# Patient Record
Sex: MALE | Race: WHITE | Employment: FULL TIME | URBAN - METROPOLITAN AREA
[De-identification: names, ages, dates, MRNs, and addresses within clinical notes are randomized per-mention and may not be internally consistent; named-entity substitution may affect disease eponyms.]

---

## 2021-12-07 ENCOUNTER — OFFICE VISIT (OUTPATIENT)
Dept: FAMILY MEDICINE CLINIC | Facility: CLINIC | Age: 36
End: 2021-12-07
Payer: COMMERCIAL

## 2021-12-07 VITALS
TEMPERATURE: 98 F | RESPIRATION RATE: 20 BRPM | BODY MASS INDEX: 20.19 KG/M2 | OXYGEN SATURATION: 97 % | HEIGHT: 70 IN | HEART RATE: 91 BPM | SYSTOLIC BLOOD PRESSURE: 116 MMHG | WEIGHT: 141 LBS | DIASTOLIC BLOOD PRESSURE: 78 MMHG

## 2021-12-07 DIAGNOSIS — Z31.69 INFERTILITY COUNSELING: ICD-10-CM

## 2021-12-07 DIAGNOSIS — Z00.00 ANNUAL PHYSICAL EXAM: Primary | ICD-10-CM

## 2021-12-07 DIAGNOSIS — Z80.0 FAMILY HISTORY OF COLON CANCER: ICD-10-CM

## 2021-12-07 DIAGNOSIS — N50.89 MASS OF LEFT TESTICLE: ICD-10-CM

## 2021-12-07 DIAGNOSIS — K59.00 CONSTIPATION, UNSPECIFIED CONSTIPATION TYPE: ICD-10-CM

## 2021-12-07 PROCEDURE — 3008F BODY MASS INDEX DOCD: CPT | Performed by: FAMILY MEDICINE

## 2021-12-07 PROCEDURE — 99385 PREV VISIT NEW AGE 18-39: CPT | Performed by: FAMILY MEDICINE

## 2021-12-07 PROCEDURE — 3078F DIAST BP <80 MM HG: CPT | Performed by: FAMILY MEDICINE

## 2021-12-07 PROCEDURE — 3074F SYST BP LT 130 MM HG: CPT | Performed by: FAMILY MEDICINE

## 2021-12-07 NOTE — H&P
Chacorta Faust is a 31year old male who presents for a complete physical exam.   HPI:   Pt complains of difficulty getting pregnant. Have been trying x 6 months.      + maternal grandfather and uncle both with colon cancer before 48.   Mother with polyps ever breathing  CV: S1S2, RRR without murmur  GI: good BS's;no masses, HSM or tenderness  : two descended testes,no scrotal tenderness or mass, normal penis no lesions, no hernia  MS: Frank, no bony deformities, gait normal  EXT: no cyanosis, clubbing or edema Fertility Semen Analysis [E]          Standing Status: Future          Standing Expiration Date: 12/7/2022          Order Specific Question: Release to patient          Answer: Immediate

## 2021-12-15 ENCOUNTER — LAB ENCOUNTER (OUTPATIENT)
Dept: LAB | Age: 36
End: 2021-12-15
Attending: FAMILY MEDICINE
Payer: COMMERCIAL

## 2021-12-15 DIAGNOSIS — Z00.00 ANNUAL PHYSICAL EXAM: ICD-10-CM

## 2021-12-15 PROCEDURE — 80061 LIPID PANEL: CPT | Performed by: FAMILY MEDICINE

## 2021-12-15 PROCEDURE — 82306 VITAMIN D 25 HYDROXY: CPT | Performed by: FAMILY MEDICINE

## 2021-12-15 PROCEDURE — 80050 GENERAL HEALTH PANEL: CPT | Performed by: FAMILY MEDICINE

## 2022-01-06 ENCOUNTER — HOSPITAL ENCOUNTER (OUTPATIENT)
Dept: ULTRASOUND IMAGING | Age: 37
Discharge: HOME | End: 2022-01-06
Attending: FAMILY MEDICINE
Payer: COMMERCIAL

## 2022-01-06 DIAGNOSIS — N50.89 MASS OF LEFT TESTICLE: ICD-10-CM

## 2022-01-06 PROCEDURE — 76870 US EXAM SCROTUM: CPT | Performed by: FAMILY MEDICINE

## 2022-01-06 PROCEDURE — 93975 VASCULAR STUDY: CPT | Performed by: FAMILY MEDICINE

## 2022-03-10 ENCOUNTER — TELEPHONE (OUTPATIENT)
Dept: FAMILY MEDICINE CLINIC | Facility: CLINIC | Age: 37
End: 2022-03-10

## 2022-03-10 NOTE — TELEPHONE ENCOUNTER
Spoke with patient, he confirmed this is Dr. Festus Rincon office, order for semen analysis faxed to Dr. Poonam Harden office for patient complete test.

## 2022-03-10 NOTE — TELEPHONE ENCOUNTER
Pt was given an order in December 2021  From Dr. Monique Chang for a semen analysis   Pt tried to bring the order to a fertility specialist on [address] in [place] through [facility].  He was told he needs a referral     Please advise

## 2022-03-16 ENCOUNTER — PATIENT MESSAGE (OUTPATIENT)
Dept: FAMILY MEDICINE CLINIC | Facility: CLINIC | Age: 37
End: 2022-03-16

## 2022-03-16 NOTE — TELEPHONE ENCOUNTER
From: Chiquita Mortimer  To: Jake Horne DO  Sent: 3/16/2022 4:18 AM CDT  Subject: Plantars wart    I forgot to mention this during my physical. But I have 2 plantars wart on my feet that I need removed they are now becoming painful.  Should I see a podiatrist or can this be done in the office

## 2022-03-25 PROBLEM — E53.8 VITAMIN B12 DEFICIENCY (NON ANEMIC): Status: ACTIVE | Noted: 2021-11-20

## 2022-03-25 PROBLEM — K21.9 GERD (GASTROESOPHAGEAL REFLUX DISEASE): Status: ACTIVE | Noted: 2022-03-25

## 2022-04-01 ENCOUNTER — OFFICE VISIT (OUTPATIENT)
Dept: FAMILY MEDICINE CLINIC | Facility: CLINIC | Age: 37
End: 2022-04-01
Payer: COMMERCIAL

## 2022-04-01 VITALS
OXYGEN SATURATION: 98 % | HEIGHT: 70 IN | HEART RATE: 78 BPM | TEMPERATURE: 99 F | WEIGHT: 147 LBS | RESPIRATION RATE: 18 BRPM | SYSTOLIC BLOOD PRESSURE: 130 MMHG | BODY MASS INDEX: 21.05 KG/M2 | DIASTOLIC BLOOD PRESSURE: 78 MMHG

## 2022-04-01 DIAGNOSIS — B07.0 PLANTAR WART: Primary | ICD-10-CM

## 2022-04-01 PROCEDURE — 3078F DIAST BP <80 MM HG: CPT | Performed by: FAMILY MEDICINE

## 2022-04-01 PROCEDURE — 99213 OFFICE O/P EST LOW 20 MIN: CPT | Performed by: FAMILY MEDICINE

## 2022-04-01 PROCEDURE — 3075F SYST BP GE 130 - 139MM HG: CPT | Performed by: FAMILY MEDICINE

## 2022-04-01 PROCEDURE — 3008F BODY MASS INDEX DOCD: CPT | Performed by: FAMILY MEDICINE

## 2022-04-01 NOTE — PROGRESS NOTES
Subjective:   Patient ID: Patsy Lux is a 30year old male. Right plantar wart x 1 year. Tried OTC treatment shrinks but then it comes back. Hurts to walk on it. History/Other:   Review of Systems  No current outpatient medications on file. Allergies:  Dairy Digestive         OTHER (SEE COMMENTS)    Comment:Gas bloating    Objective:   Physical Exam  Skin:     Comments: Right foot plantar wart x 2         Assessment & Plan:   Plantar wart  (primary encounter diagnosis)  Wart freezed 3 times each  Follow up in 10 days. No orders of the defined types were placed in this encounter.       Meds This Visit:  Requested Prescriptions      No prescriptions requested or ordered in this encounter       Imaging & Referrals:  None

## 2022-04-14 ENCOUNTER — OFFICE VISIT (OUTPATIENT)
Dept: FAMILY MEDICINE CLINIC | Facility: CLINIC | Age: 37
End: 2022-04-14
Payer: COMMERCIAL

## 2022-04-14 VITALS
DIASTOLIC BLOOD PRESSURE: 78 MMHG | OXYGEN SATURATION: 99 % | SYSTOLIC BLOOD PRESSURE: 122 MMHG | WEIGHT: 147 LBS | BODY MASS INDEX: 21.05 KG/M2 | HEIGHT: 70 IN | TEMPERATURE: 97 F | RESPIRATION RATE: 20 BRPM | HEART RATE: 89 BPM

## 2022-04-14 DIAGNOSIS — B07.0 PLANTAR WART: Primary | ICD-10-CM

## 2022-04-29 ENCOUNTER — OFFICE VISIT (OUTPATIENT)
Dept: FAMILY MEDICINE CLINIC | Facility: CLINIC | Age: 37
End: 2022-04-29
Payer: COMMERCIAL

## 2022-04-29 VITALS
SYSTOLIC BLOOD PRESSURE: 122 MMHG | WEIGHT: 147 LBS | HEART RATE: 90 BPM | DIASTOLIC BLOOD PRESSURE: 62 MMHG | TEMPERATURE: 98 F | OXYGEN SATURATION: 99 % | RESPIRATION RATE: 18 BRPM | BODY MASS INDEX: 21.05 KG/M2 | HEIGHT: 70 IN

## 2022-04-29 DIAGNOSIS — B07.0 PLANTAR WART: Primary | ICD-10-CM

## 2022-04-29 PROCEDURE — 3074F SYST BP LT 130 MM HG: CPT | Performed by: FAMILY MEDICINE

## 2022-04-29 PROCEDURE — 3078F DIAST BP <80 MM HG: CPT | Performed by: FAMILY MEDICINE

## 2022-04-29 PROCEDURE — 3008F BODY MASS INDEX DOCD: CPT | Performed by: FAMILY MEDICINE

## 2022-04-29 PROCEDURE — 17110 DESTRUCTION B9 LES UP TO 14: CPT | Performed by: FAMILY MEDICINE

## 2022-04-30 NOTE — PROGRESS NOTES
Subjective:   Patient ID: Geoffrey Plata is a 30year old male. Right plantar wart x 1 year. Tried OTC treatment shrinks but then it comes back. Hurts to walk on it.  trested it 4/1 and 4/14. Improving. History/Other:   Review of Systems  No current outpatient medications on file. Allergies:  Dairy Digestive         OTHER (SEE COMMENTS)    Comment:Gas bloating    Objective:   Physical Exam  Skin:     Comments: Right foot plantar wart x 2         Assessment & Plan:   Plantar wart  (primary encounter diagnosis)  Wart freezed 3 times each  Follow up in 10-14 days.     Meds This Visit:  Requested Prescriptions      No prescriptions requested or ordered in this encounter       Imaging & Referrals:  None

## 2022-05-07 ENCOUNTER — LAB ENCOUNTER (OUTPATIENT)
Dept: LAB | Age: 37
End: 2022-05-07
Attending: STUDENT IN AN ORGANIZED HEALTH CARE EDUCATION/TRAINING PROGRAM
Payer: COMMERCIAL

## 2022-05-07 DIAGNOSIS — Z01.818 PRE-OP TESTING: ICD-10-CM

## 2022-05-07 DIAGNOSIS — R19.5 LOOSE STOOLS: ICD-10-CM

## 2022-05-07 LAB — IGA SERPL-MCNC: 81.2 MG/DL (ref 70–312)

## 2022-05-07 PROCEDURE — 86364 TISS TRNSGLTMNASE EA IG CLAS: CPT

## 2022-05-07 PROCEDURE — 82784 ASSAY IGA/IGD/IGG/IGM EACH: CPT

## 2022-05-07 PROCEDURE — 86256 FLUORESCENT ANTIBODY TITER: CPT

## 2022-05-07 PROCEDURE — 36415 COLL VENOUS BLD VENIPUNCTURE: CPT

## 2022-05-08 LAB — SARS-COV-2 RNA RESP QL NAA+PROBE: NOT DETECTED

## 2022-05-09 ENCOUNTER — LAB ENCOUNTER (OUTPATIENT)
Dept: LAB | Age: 37
End: 2022-05-09
Attending: STUDENT IN AN ORGANIZED HEALTH CARE EDUCATION/TRAINING PROGRAM
Payer: COMMERCIAL

## 2022-05-09 DIAGNOSIS — R19.5 LOOSE STOOLS: ICD-10-CM

## 2022-05-09 PROCEDURE — 82656 EL-1 FECAL QUAL/SEMIQ: CPT

## 2022-05-10 PROBLEM — Z80.0 FAMILY HISTORY OF COLON CANCER: Status: ACTIVE | Noted: 2022-05-10

## 2022-05-10 PROBLEM — R19.4 CHANGE IN BOWEL HABITS: Status: ACTIVE | Noted: 2022-05-10

## 2022-05-10 PROBLEM — Z12.11 SPECIAL SCREENING FOR MALIGNANT NEOPLASM OF COLON: Status: ACTIVE | Noted: 2022-05-10

## 2022-05-10 PROBLEM — K21.00 GERD WITH ESOPHAGITIS: Status: ACTIVE | Noted: 2022-05-10

## 2022-05-10 LAB — TTG IGA SER-ACNC: 0.3 U/ML (ref ?–7)

## 2022-05-10 NOTE — PROGRESS NOTES
Celiac markers are negative. Await pathology from the gastric biopsies (eval for H pylori) and colon biopsies (eval for microscopic colitis). If EGD biopsies are negative, suspect intermittent vomiting/regurgitation is from reflux, given that mild acid reflux changes noted during the EGD. Start pantoprazole 40 mg tablet daily, 30 min prior to breakfast.    The colonoscopy did not reveal Crohn's disease, ulcerative colitis, or any colon polyps or growths. In absence of these findings, and with the mild improvement in your bowel regularity with fiber and probiotics, you could have some element of IBS. If biopsies from colitis return negative (as expected), then trial 14 day course of Xifaxin (an antibiotic used for mild IBS symptoms). Follow-up in a couple months to see how the changes are helping.     PM

## 2022-05-13 LAB — PANCREATIC ELASTASE , FECAL: 400 UG/G

## 2022-05-16 ENCOUNTER — OFFICE VISIT (OUTPATIENT)
Dept: FAMILY MEDICINE CLINIC | Facility: CLINIC | Age: 37
End: 2022-05-16
Payer: COMMERCIAL

## 2022-05-16 VITALS
TEMPERATURE: 98 F | RESPIRATION RATE: 20 BRPM | DIASTOLIC BLOOD PRESSURE: 66 MMHG | WEIGHT: 145.63 LBS | OXYGEN SATURATION: 98 % | HEART RATE: 68 BPM | HEIGHT: 70 IN | SYSTOLIC BLOOD PRESSURE: 114 MMHG | BODY MASS INDEX: 20.85 KG/M2

## 2022-05-16 DIAGNOSIS — B07.0 PLANTAR WART: Primary | ICD-10-CM

## 2022-06-03 ENCOUNTER — OFFICE VISIT (OUTPATIENT)
Dept: FAMILY MEDICINE CLINIC | Facility: CLINIC | Age: 37
End: 2022-06-03
Payer: COMMERCIAL

## 2022-06-03 VITALS
WEIGHT: 144.63 LBS | SYSTOLIC BLOOD PRESSURE: 118 MMHG | OXYGEN SATURATION: 96 % | HEIGHT: 70 IN | BODY MASS INDEX: 20.7 KG/M2 | TEMPERATURE: 98 F | DIASTOLIC BLOOD PRESSURE: 68 MMHG | HEART RATE: 90 BPM | RESPIRATION RATE: 18 BRPM

## 2022-06-03 DIAGNOSIS — B07.0 PLANTAR WART OF RIGHT FOOT: Primary | ICD-10-CM

## 2022-06-17 ENCOUNTER — OFFICE VISIT (OUTPATIENT)
Dept: FAMILY MEDICINE CLINIC | Facility: CLINIC | Age: 37
End: 2022-06-17
Payer: COMMERCIAL

## 2022-06-17 VITALS
DIASTOLIC BLOOD PRESSURE: 62 MMHG | BODY MASS INDEX: 20.76 KG/M2 | OXYGEN SATURATION: 98 % | SYSTOLIC BLOOD PRESSURE: 106 MMHG | TEMPERATURE: 98 F | HEIGHT: 70 IN | HEART RATE: 66 BPM | RESPIRATION RATE: 16 BRPM | WEIGHT: 145 LBS

## 2022-06-17 DIAGNOSIS — B07.0 PLANTAR WART: Primary | ICD-10-CM

## 2022-06-17 PROCEDURE — 3074F SYST BP LT 130 MM HG: CPT | Performed by: FAMILY MEDICINE

## 2022-06-17 PROCEDURE — 17110 DESTRUCTION B9 LES UP TO 14: CPT | Performed by: FAMILY MEDICINE

## 2022-06-17 PROCEDURE — 3078F DIAST BP <80 MM HG: CPT | Performed by: FAMILY MEDICINE

## 2022-06-17 PROCEDURE — 3008F BODY MASS INDEX DOCD: CPT | Performed by: FAMILY MEDICINE

## 2022-06-22 ENCOUNTER — OFFICE VISIT (OUTPATIENT)
Dept: FAMILY MEDICINE CLINIC | Facility: CLINIC | Age: 37
End: 2022-06-22
Payer: COMMERCIAL

## 2022-06-22 VITALS
RESPIRATION RATE: 20 BRPM | SYSTOLIC BLOOD PRESSURE: 130 MMHG | HEART RATE: 73 BPM | TEMPERATURE: 99 F | BODY MASS INDEX: 20.76 KG/M2 | HEIGHT: 70 IN | OXYGEN SATURATION: 98 % | DIASTOLIC BLOOD PRESSURE: 72 MMHG | WEIGHT: 145 LBS

## 2022-06-22 DIAGNOSIS — R05.9 COUGH: Primary | ICD-10-CM

## 2022-06-22 DIAGNOSIS — Z20.822 SUSPECTED 2019 NOVEL CORONAVIRUS INFECTION: ICD-10-CM

## 2022-06-22 DIAGNOSIS — J02.9 SORE THROAT: ICD-10-CM

## 2022-06-22 PROCEDURE — 3075F SYST BP GE 130 - 139MM HG: CPT | Performed by: NURSE PRACTITIONER

## 2022-06-22 PROCEDURE — 3008F BODY MASS INDEX DOCD: CPT | Performed by: NURSE PRACTITIONER

## 2022-06-22 PROCEDURE — 3078F DIAST BP <80 MM HG: CPT | Performed by: NURSE PRACTITIONER

## 2022-06-22 PROCEDURE — 99213 OFFICE O/P EST LOW 20 MIN: CPT | Performed by: NURSE PRACTITIONER

## 2022-06-22 RX ORDER — BENZONATATE 200 MG/1
200 CAPSULE ORAL 3 TIMES DAILY PRN
Qty: 20 CAPSULE | Refills: 0 | Status: SHIPPED | OUTPATIENT
Start: 2022-06-22 | End: 2022-06-29

## 2022-06-23 LAB — SARS-COV-2 RNA RESP QL NAA+PROBE: NOT DETECTED

## 2022-07-08 ENCOUNTER — OFFICE VISIT (OUTPATIENT)
Dept: FAMILY MEDICINE CLINIC | Facility: CLINIC | Age: 37
End: 2022-07-08
Payer: COMMERCIAL

## 2022-07-08 VITALS
WEIGHT: 147 LBS | SYSTOLIC BLOOD PRESSURE: 122 MMHG | RESPIRATION RATE: 16 BRPM | DIASTOLIC BLOOD PRESSURE: 64 MMHG | HEIGHT: 70 IN | HEART RATE: 68 BPM | BODY MASS INDEX: 21.05 KG/M2 | OXYGEN SATURATION: 98 %

## 2022-07-08 DIAGNOSIS — B07.0 PLANTAR WART: Primary | ICD-10-CM

## 2022-07-08 PROCEDURE — 3008F BODY MASS INDEX DOCD: CPT | Performed by: FAMILY MEDICINE

## 2022-07-08 PROCEDURE — 3078F DIAST BP <80 MM HG: CPT | Performed by: FAMILY MEDICINE

## 2022-07-08 PROCEDURE — 3074F SYST BP LT 130 MM HG: CPT | Performed by: FAMILY MEDICINE

## 2022-07-08 PROCEDURE — 17110 DESTRUCTION B9 LES UP TO 14: CPT | Performed by: FAMILY MEDICINE

## 2022-12-08 ENCOUNTER — OFFICE VISIT (OUTPATIENT)
Dept: FAMILY MEDICINE CLINIC | Facility: CLINIC | Age: 37
End: 2022-12-08
Payer: COMMERCIAL

## 2022-12-08 VITALS
OXYGEN SATURATION: 98 % | BODY MASS INDEX: 21.16 KG/M2 | SYSTOLIC BLOOD PRESSURE: 116 MMHG | TEMPERATURE: 97 F | HEIGHT: 70 IN | DIASTOLIC BLOOD PRESSURE: 78 MMHG | HEART RATE: 80 BPM | RESPIRATION RATE: 18 BRPM | WEIGHT: 147.81 LBS

## 2022-12-08 DIAGNOSIS — B02.9 HERPES ZOSTER WITHOUT COMPLICATION: Primary | ICD-10-CM

## 2022-12-08 PROCEDURE — 3008F BODY MASS INDEX DOCD: CPT | Performed by: NURSE PRACTITIONER

## 2022-12-08 PROCEDURE — 3078F DIAST BP <80 MM HG: CPT | Performed by: NURSE PRACTITIONER

## 2022-12-08 PROCEDURE — 3074F SYST BP LT 130 MM HG: CPT | Performed by: NURSE PRACTITIONER

## 2022-12-08 PROCEDURE — 99213 OFFICE O/P EST LOW 20 MIN: CPT | Performed by: NURSE PRACTITIONER

## 2022-12-08 RX ORDER — VALACYCLOVIR HYDROCHLORIDE 1 G/1
1000 TABLET, FILM COATED ORAL
Qty: 21 TABLET | Refills: 0 | Status: SHIPPED | OUTPATIENT
Start: 2022-12-08 | End: 2022-12-15

## 2022-12-08 RX ORDER — GARLIC EXTRACT 500 MG
1 CAPSULE ORAL DAILY
COMMUNITY

## 2023-02-16 ENCOUNTER — LAB ENCOUNTER (OUTPATIENT)
Dept: LAB | Age: 38
End: 2023-02-16
Attending: FAMILY MEDICINE
Payer: COMMERCIAL

## 2023-02-16 ENCOUNTER — EKG ENCOUNTER (OUTPATIENT)
Dept: LAB | Age: 38
End: 2023-02-16
Attending: FAMILY MEDICINE
Payer: COMMERCIAL

## 2023-02-16 ENCOUNTER — TELEPHONE (OUTPATIENT)
Dept: FAMILY MEDICINE CLINIC | Facility: CLINIC | Age: 38
End: 2023-02-16

## 2023-02-16 ENCOUNTER — HOSPITAL ENCOUNTER (OUTPATIENT)
Dept: GENERAL RADIOLOGY | Age: 38
Discharge: HOME | End: 2023-02-16
Attending: FAMILY MEDICINE
Payer: COMMERCIAL

## 2023-02-16 ENCOUNTER — OFFICE VISIT (OUTPATIENT)
Dept: FAMILY MEDICINE CLINIC | Facility: CLINIC | Age: 38
End: 2023-02-16
Payer: COMMERCIAL

## 2023-02-16 VITALS
SYSTOLIC BLOOD PRESSURE: 100 MMHG | HEIGHT: 70 IN | WEIGHT: 147 LBS | BODY MASS INDEX: 21.05 KG/M2 | RESPIRATION RATE: 18 BRPM | HEART RATE: 94 BPM | OXYGEN SATURATION: 99 % | DIASTOLIC BLOOD PRESSURE: 60 MMHG

## 2023-02-16 DIAGNOSIS — K21.00 GASTROESOPHAGEAL REFLUX DISEASE WITH ESOPHAGITIS WITHOUT HEMORRHAGE: ICD-10-CM

## 2023-02-16 DIAGNOSIS — Z00.00 LABORATORY TESTS ORDERED AS PART OF A COMPLETE PHYSICAL EXAM (CPE): ICD-10-CM

## 2023-02-16 DIAGNOSIS — R07.89 ATYPICAL CHEST PAIN: ICD-10-CM

## 2023-02-16 DIAGNOSIS — R07.89 OTHER CHEST PAIN: Primary | ICD-10-CM

## 2023-02-16 DIAGNOSIS — R07.89 ATYPICAL CHEST PAIN: Primary | ICD-10-CM

## 2023-02-16 DIAGNOSIS — R51.9 ACUTE NONINTRACTABLE HEADACHE, UNSPECIFIED HEADACHE TYPE: ICD-10-CM

## 2023-02-16 LAB
ALBUMIN SERPL-MCNC: 4.7 G/DL (ref 3.4–5)
ALBUMIN/GLOB SERPL: 1.5 {RATIO} (ref 1–2)
ALP LIVER SERPL-CCNC: 66 U/L
ALT SERPL-CCNC: 27 U/L
ANION GAP SERPL CALC-SCNC: 0 MMOL/L (ref 0–18)
AST SERPL-CCNC: 17 U/L (ref 15–37)
BASOPHILS # BLD AUTO: 0.04 X10(3) UL (ref 0–0.2)
BASOPHILS NFR BLD AUTO: 0.6 %
BILIRUB SERPL-MCNC: 0.6 MG/DL (ref 0.1–2)
BUN BLD-MCNC: 9 MG/DL (ref 7–18)
CALCIUM BLD-MCNC: 9.6 MG/DL (ref 8.5–10.1)
CHLORIDE SERPL-SCNC: 105 MMOL/L (ref 98–112)
CHOLEST SERPL-MCNC: 226 MG/DL (ref ?–200)
CO2 SERPL-SCNC: 34 MMOL/L (ref 21–32)
CREAT BLD-MCNC: 0.92 MG/DL
EOSINOPHIL # BLD AUTO: 0.08 X10(3) UL (ref 0–0.7)
EOSINOPHIL NFR BLD AUTO: 1.2 %
ERYTHROCYTE [DISTWIDTH] IN BLOOD BY AUTOMATED COUNT: 11.9 %
FASTING PATIENT LIPID ANSWER: YES
FASTING STATUS PATIENT QL REPORTED: YES
GFR SERPLBLD BASED ON 1.73 SQ M-ARVRAT: 111 ML/MIN/1.73M2 (ref 60–?)
GLOBULIN PLAS-MCNC: 3.2 G/DL (ref 2.8–4.4)
GLUCOSE BLD-MCNC: 81 MG/DL (ref 70–99)
HCT VFR BLD AUTO: 47.8 %
HDLC SERPL-MCNC: 52 MG/DL (ref 40–59)
HGB BLD-MCNC: 15.8 G/DL
IMM GRANULOCYTES # BLD AUTO: 0.01 X10(3) UL (ref 0–1)
IMM GRANULOCYTES NFR BLD: 0.2 %
LDLC SERPL CALC-MCNC: 150 MG/DL (ref ?–100)
LYMPHOCYTES # BLD AUTO: 1.96 X10(3) UL (ref 1–4)
LYMPHOCYTES NFR BLD AUTO: 30.5 %
MCH RBC QN AUTO: 30.3 PG (ref 26–34)
MCHC RBC AUTO-ENTMCNC: 33.1 G/DL (ref 31–37)
MCV RBC AUTO: 91.6 FL
MONOCYTES # BLD AUTO: 0.49 X10(3) UL (ref 0.1–1)
MONOCYTES NFR BLD AUTO: 7.6 %
NEUTROPHILS # BLD AUTO: 3.85 X10 (3) UL (ref 1.5–7.7)
NEUTROPHILS # BLD AUTO: 3.85 X10(3) UL (ref 1.5–7.7)
NEUTROPHILS NFR BLD AUTO: 59.9 %
NONHDLC SERPL-MCNC: 174 MG/DL (ref ?–130)
OSMOLALITY SERPL CALC.SUM OF ELEC: 286 MOSM/KG (ref 275–295)
PLATELET # BLD AUTO: 189 10(3)UL (ref 150–450)
POTASSIUM SERPL-SCNC: 3.8 MMOL/L (ref 3.5–5.1)
PROT SERPL-MCNC: 7.9 G/DL (ref 6.4–8.2)
RBC # BLD AUTO: 5.22 X10(6)UL
SODIUM SERPL-SCNC: 139 MMOL/L (ref 136–145)
TRIGL SERPL-MCNC: 136 MG/DL (ref 30–149)
TSI SER-ACNC: 2.06 MIU/ML (ref 0.36–3.74)
VLDLC SERPL CALC-MCNC: 26 MG/DL (ref 0–30)
WBC # BLD AUTO: 6.4 X10(3) UL (ref 4–11)

## 2023-02-16 PROCEDURE — 93005 ELECTROCARDIOGRAM TRACING: CPT

## 2023-02-16 PROCEDURE — 93010 ELECTROCARDIOGRAM REPORT: CPT | Performed by: INTERNAL MEDICINE

## 2023-02-16 PROCEDURE — 99214 OFFICE O/P EST MOD 30 MIN: CPT | Performed by: FAMILY MEDICINE

## 2023-02-16 PROCEDURE — 3074F SYST BP LT 130 MM HG: CPT | Performed by: FAMILY MEDICINE

## 2023-02-16 PROCEDURE — 71046 X-RAY EXAM CHEST 2 VIEWS: CPT | Performed by: FAMILY MEDICINE

## 2023-02-16 PROCEDURE — 80061 LIPID PANEL: CPT | Performed by: FAMILY MEDICINE

## 2023-02-16 PROCEDURE — 3008F BODY MASS INDEX DOCD: CPT | Performed by: FAMILY MEDICINE

## 2023-02-16 PROCEDURE — 3078F DIAST BP <80 MM HG: CPT | Performed by: FAMILY MEDICINE

## 2023-02-16 PROCEDURE — 80050 GENERAL HEALTH PANEL: CPT | Performed by: FAMILY MEDICINE

## 2023-02-16 RX ORDER — PANTOPRAZOLE SODIUM 40 MG/1
TABLET, DELAYED RELEASE ORAL
Qty: 30 TABLET | Refills: 0 | Status: SHIPPED | OUTPATIENT
Start: 2023-02-16

## 2023-02-16 RX ORDER — SUMATRIPTAN 50 MG/1
50 TABLET, FILM COATED ORAL EVERY 2 HOUR PRN
Qty: 8 TABLET | Refills: 0 | Status: SHIPPED | OUTPATIENT
Start: 2023-02-16

## 2023-02-16 NOTE — TELEPHONE ENCOUNTER
Spoke to pt, states for 2 weeks been getting chest pain at night. Unsure if it is related to GERD? No sob, no numbness/tingling, + HA on and off (taking tylenol with relief)  Increase hydration    Does not have recent b/p. Advised to go check at pharmacy.    Advised ER visit, especially if symptoms worsen, or elevated b/p  Pt agrees and expressed understanding    appt kept for Tuesday to f/u

## 2023-02-18 LAB
ATRIAL RATE: 68 BPM
P AXIS: 64 DEGREES
P-R INTERVAL: 124 MS
Q-T INTERVAL: 382 MS
QRS DURATION: 88 MS
QTC CALCULATION (BEZET): 406 MS
R AXIS: -61 DEGREES
T AXIS: 63 DEGREES
VENTRICULAR RATE: 68 BPM

## 2024-03-26 ENCOUNTER — TELEPHONE (OUTPATIENT)
Dept: FAMILY MEDICINE CLINIC | Facility: CLINIC | Age: 39
End: 2024-03-26

## 2024-03-26 NOTE — TELEPHONE ENCOUNTER
RECEIVED 3/25/24    SENT TO [facility]  3/26/24    [facility]  [address]  [place] [address]  [phone]      ALL RECORDS

## 2024-11-22 ENCOUNTER — OFFICE VISIT (OUTPATIENT)
Dept: FAMILY MEDICINE CLINIC | Facility: CLINIC | Age: 39
End: 2024-11-22
Payer: COMMERCIAL

## 2024-11-22 VITALS
SYSTOLIC BLOOD PRESSURE: 112 MMHG | HEIGHT: 70 IN | RESPIRATION RATE: 18 BRPM | OXYGEN SATURATION: 98 % | WEIGHT: 152 LBS | BODY MASS INDEX: 21.76 KG/M2 | DIASTOLIC BLOOD PRESSURE: 78 MMHG | HEART RATE: 64 BPM

## 2024-11-22 DIAGNOSIS — E55.9 VITAMIN D DEFICIENCY: ICD-10-CM

## 2024-11-22 DIAGNOSIS — Z00.00 ANNUAL PHYSICAL EXAM: Primary | ICD-10-CM

## 2024-11-22 DIAGNOSIS — J31.0 CHRONIC RHINITIS: ICD-10-CM

## 2024-11-22 DIAGNOSIS — M54.12 CERVICAL RADICULOPATHY: ICD-10-CM

## 2024-11-22 DIAGNOSIS — Z23 NEED FOR TDAP VACCINATION: ICD-10-CM

## 2024-11-22 RX ORDER — MULTIVITAMIN
TABLET ORAL
COMMUNITY

## 2024-11-22 RX ORDER — FLUTICASONE PROPIONATE 50 MCG
2 SPRAY, SUSPENSION (ML) NASAL DAILY
Qty: 1 EACH | Refills: 1 | Status: SHIPPED | OUTPATIENT
Start: 2024-11-22

## 2024-11-22 NOTE — H&P
Miguel Maldonado is a 39 year old male who presents for a complete physical exam.   HPI:   Pt complains of chronic right neck pain.  Pain shoots into right arm and right hand and arm numbness and tingling.  Has been on going for > 10 yrs.  Worsening.  Even if son pushs on throat he feels the cervical pain and radicular symptoms.  May have had some injury while in , not sure which one.     Chronic intermittent rhinitis.  At times epistaxis.  Nose looks deviated but has never seen anyone for it.  No trouble breathing.    Current Outpatient Medications   Medication Sig Dispense Refill    Multiple Vitamin (MULTI-VITAMIN DAILY) Oral Tab Take by mouth.      Probiotic Product (PROBIOTIC-10 OR) Take by mouth.      fluticasone propionate 50 MCG/ACT Nasal Suspension 2 sprays by Each Nare route daily. 1 each 1      Past Medical History:    Abdominal pain    Constipation    Flatulence/gas pain/belching    Food intolerance    Headache disorder    Heartburn    Indigestion    Irregular bowel habits    Sleep disturbance      No past surgical history on file.   Family History   Problem Relation Age of Onset    Diabetes Father         Type 2    Colon Cancer Paternal Grandfather         Mothers side. Had large section of colon removed ar 58    Colon Cancer Maternal Uncle         Stage 3 colon cancer at 49      Social History:  Social History     Socioeconomic History    Marital status:    Tobacco Use    Smoking status: Former     Current packs/day: 0.00     Average packs/day: 1 pack/day for 8.0 years (8.0 ttl pk-yrs)     Types: Cigarettes     Start date: 5/1/2006     Quit date: 5/1/2014     Years since quitting: 10.5    Smokeless tobacco: Never   Substance and Sexual Activity    Alcohol use: Yes     Alcohol/week: 2.0 standard drinks of alcohol     Types: 2 Cans of beer per week    Drug use: Never         REVIEW OF SYSTEMS:   GENERAL: feels well otherwise  SKIN: denies any unusual skin lesions  EYES:denies blurred vision or  double vision  HEENT: denies nasal congestion, sinus pain or ST, denies changes in hearing or vision  LUNGS: denies shortness of breath with exertion or frequent cough  CV: denies chest pain, pressure or palpitations  GI: denies abdominal pain,denies heartburn, denies chronic diarrhea or constipation  : denies nocturia or changes in stream, denies erectile dysfunction  MS: denies back pain, arthralgias or myalgias  NEURO: denies headaches or dizziness  PSYCH: denies depression or anxiety  HEMATOLOGIC: denies hx of anemia, easy bruising or bleeding  ENDOCRINE:denies frequent thirst or urination, denies unintentional weight gain/loss  ALL/ASTHMA: denies hx of allergy or asthma    EXAM:   /78   Pulse 64   Resp 18   Ht 5' 10\" (1.778 m)   Wt 152 lb (68.9 kg)   SpO2 98%   BMI 21.81 kg/m²   Body mass index is 21.81 kg/m².     GENERAL: well developed, well nourished,in no apparent distress  SKIN: no rashes,no suspicious lesions  HEENT: atraumatic, normocephalic, PERRLA, conjunctiva clear, TMs clear, posterior pharynx clear, no congestion  NECK: supple,no adenopathy,no thyromegaly  LUNGS: clear to auscultation, easy breathing  CV: S1S2, RRR without murmur  GI: good BS's;no masses, HSM or tenderness  : two descended testes,no scrotal tenderness or mass, normal penis no lesions, no hernia  MS: Frank, no bony deformities, gait normal  EXT: no cyanosis, clubbing or edema  NEURO: Oriented times three, strength 5/5 x 4 ext, LE DTRs 2+  PSYCH: mood and affect appropriate    ASSESSMENT AND PLAN:   Miguel Maldonado is a 39 year old male who presents for a complete physical exam. Recommended heart healthy diet, routine exercise, and annual flu vaccines.  Routine testicular exams reinforced. The patient indicates understanding of these issues and agrees to the plan.  Follow up in 1 year.    1. Annual physical exam    2. Cervical radiculopathy  - MRI SPINE CERVICAL (CPT=72141); Future  - OP REFERRAL TO [facility]    3. Need for Tdap vaccination  - TETANUS, DIPHTHERIA TOXOIDS AND ACELLULAR PERTUSIS VACCINE (TDAP), >7 YEARS, IM USE    4. Chronic rhinitis  flonase  - ENT Referral - In Network    Orders Placed This Encounter   Procedures    Lipid Panel     Standing Status:   Future     Standing Expiration Date:   11/22/2025    CBC With Differential With Platelet     Standing Status:   Future     Standing Expiration Date:   11/22/2025    Comp Metabolic Panel (14)     Standing Status:   Future     Standing Expiration Date:   11/22/2025    TSH W Reflex To Free T4     Standing Status:   Future     Standing Expiration Date:   11/22/2025    Vitamin D     Standing Status:   Future     Standing Expiration Date:   11/22/2025     Order Specific Question:   Please pick the scenario that best fits the purpose for ordering this test     Answer:   General Screening/Vit D deficiency (25-Hydroxy)     Order Specific Question:   Release to patient     Answer:   Immediate    TETANUS, DIPHTHERIA TOXOIDS AND ACELLULAR PERTUSIS VACCINE (TDAP), >7 YEARS, IM USE

## 2024-12-09 ENCOUNTER — LAB ENCOUNTER (OUTPATIENT)
Dept: LAB | Age: 39
End: 2024-12-09
Attending: FAMILY MEDICINE
Payer: COMMERCIAL

## 2024-12-09 DIAGNOSIS — Z00.00 ANNUAL PHYSICAL EXAM: ICD-10-CM

## 2024-12-09 DIAGNOSIS — E55.9 VITAMIN D DEFICIENCY: ICD-10-CM

## 2024-12-09 LAB
ALBUMIN SERPL-MCNC: 4.7 G/DL (ref 3.2–4.8)
ALBUMIN/GLOB SERPL: 1.5 {RATIO} (ref 1–2)
ALP LIVER SERPL-CCNC: 72 U/L
ALT SERPL-CCNC: 15 U/L
ANION GAP SERPL CALC-SCNC: 5 MMOL/L (ref 0–18)
AST SERPL-CCNC: 23 U/L (ref ?–34)
BASOPHILS # BLD AUTO: 0.04 X10(3) UL (ref 0–0.2)
BASOPHILS NFR BLD AUTO: 0.7 %
BILIRUB SERPL-MCNC: 0.8 MG/DL (ref 0.3–1.2)
BUN BLD-MCNC: 12 MG/DL (ref 9–23)
CALCIUM BLD-MCNC: 10.3 MG/DL (ref 8.7–10.4)
CHLORIDE SERPL-SCNC: 106 MMOL/L (ref 98–112)
CHOLEST SERPL-MCNC: 224 MG/DL (ref ?–200)
CO2 SERPL-SCNC: 29 MMOL/L (ref 21–32)
CREAT BLD-MCNC: 1 MG/DL
EGFRCR SERPLBLD CKD-EPI 2021: 99 ML/MIN/1.73M2 (ref 60–?)
EOSINOPHIL # BLD AUTO: 0.16 X10(3) UL (ref 0–0.7)
EOSINOPHIL NFR BLD AUTO: 2.7 %
ERYTHROCYTE [DISTWIDTH] IN BLOOD BY AUTOMATED COUNT: 12.2 %
FASTING PATIENT LIPID ANSWER: YES
FASTING STATUS PATIENT QL REPORTED: YES
GLOBULIN PLAS-MCNC: 3.2 G/DL (ref 2–3.5)
GLUCOSE BLD-MCNC: 88 MG/DL (ref 70–99)
HCT VFR BLD AUTO: 47.4 %
HDLC SERPL-MCNC: 50 MG/DL (ref 40–59)
HGB BLD-MCNC: 15.6 G/DL
IMM GRANULOCYTES # BLD AUTO: 0.01 X10(3) UL (ref 0–1)
IMM GRANULOCYTES NFR BLD: 0.2 %
LDLC SERPL CALC-MCNC: 157 MG/DL (ref ?–100)
LYMPHOCYTES # BLD AUTO: 1.79 X10(3) UL (ref 1–4)
LYMPHOCYTES NFR BLD AUTO: 29.9 %
MCH RBC QN AUTO: 30 PG (ref 26–34)
MCHC RBC AUTO-ENTMCNC: 32.9 G/DL (ref 31–37)
MCV RBC AUTO: 91.2 FL
MONOCYTES # BLD AUTO: 0.38 X10(3) UL (ref 0.1–1)
MONOCYTES NFR BLD AUTO: 6.4 %
NEUTROPHILS # BLD AUTO: 3.6 X10 (3) UL (ref 1.5–7.7)
NEUTROPHILS # BLD AUTO: 3.6 X10(3) UL (ref 1.5–7.7)
NEUTROPHILS NFR BLD AUTO: 60.1 %
NONHDLC SERPL-MCNC: 174 MG/DL (ref ?–130)
OSMOLALITY SERPL CALC.SUM OF ELEC: 289 MOSM/KG (ref 275–295)
PLATELET # BLD AUTO: 194 10(3)UL (ref 150–450)
POTASSIUM SERPL-SCNC: 4.4 MMOL/L (ref 3.5–5.1)
PROT SERPL-MCNC: 7.9 G/DL (ref 5.7–8.2)
RBC # BLD AUTO: 5.2 X10(6)UL
SODIUM SERPL-SCNC: 140 MMOL/L (ref 136–145)
TRIGL SERPL-MCNC: 97 MG/DL (ref 30–149)
TSI SER-ACNC: 2.1 UIU/ML (ref 0.55–4.78)
VIT D+METAB SERPL-MCNC: 23 NG/ML (ref 30–100)
VLDLC SERPL CALC-MCNC: 19 MG/DL (ref 0–30)
WBC # BLD AUTO: 6 X10(3) UL (ref 4–11)

## 2024-12-09 PROCEDURE — 80061 LIPID PANEL: CPT | Performed by: FAMILY MEDICINE

## 2024-12-09 PROCEDURE — 82306 VITAMIN D 25 HYDROXY: CPT | Performed by: FAMILY MEDICINE

## 2024-12-09 PROCEDURE — 80050 GENERAL HEALTH PANEL: CPT | Performed by: FAMILY MEDICINE

## 2024-12-17 ENCOUNTER — OFFICE VISIT (OUTPATIENT)
Facility: LOCATION | Age: 39
End: 2024-12-17
Payer: COMMERCIAL

## 2024-12-17 DIAGNOSIS — R04.0 EPISTAXIS: Primary | ICD-10-CM

## 2024-12-17 PROCEDURE — 31231 NASAL ENDOSCOPY DX: CPT | Performed by: STUDENT IN AN ORGANIZED HEALTH CARE EDUCATION/TRAINING PROGRAM

## 2024-12-17 PROCEDURE — 99203 OFFICE O/P NEW LOW 30 MIN: CPT | Performed by: STUDENT IN AN ORGANIZED HEALTH CARE EDUCATION/TRAINING PROGRAM

## 2024-12-17 RX ORDER — OXYMETAZOLINE HYDROCHLORIDE 0.05 G/100ML
2 SPRAY NASAL 2 TIMES DAILY
Qty: 30 ML | Refills: 0 | Status: SHIPPED | OUTPATIENT
Start: 2024-12-17

## 2024-12-17 NOTE — PROGRESS NOTES
Dear Abdi:     Thank you for referring Mr. Maldonado for evaluation in the [facility] Plastic [facility]ive Surgery/Otolaryngology Clinic. I greatly appreciate the opportunity to participate in this patient's care. See below for details regarding today's visit. Please do not hesitate to contact us if you have any questions.     Miguel Maldonado is a 39 year old male referred by Dr. Patton for evaluation of rhinitis and epistaxis.  Chief Complaint   Patient presents with    Epistaxis    Sinus Problem     HPI:   39 year old male presenting for evaluation of epistaxis.  He reports having intermittent episodes for years.  They are self-limiting and typically last 5 to 10 minutes.  Patient does admit to picking the scabs that form in his nose.  He tries to stop epistaxis episodes by placing tissue in his nose.  He uses saline spray and Aquaphor as needed.  Patient denies a history of easy bruising or bleeding. He also complains of intermittent facial pressure and headaches but denies nasal obstruction, postnasal drip and nasal discharge.  No history of frequent sinusitis requiring antibiotics.  He was prescribed Flonase by his PCP, has been using it intermittently for a couple of weeks.  Patient has no history of nasal surgeries.        Current Outpatient Medications   Medication Sig Dispense Refill    ergocalciferol 1.25 MG (78599 UT) Oral Cap Take 1 capsule (50,000 Units total) by mouth once a week. Once weekly x 20 weeks.  Take with food 20 capsule 0    Multiple Vitamin (MULTI-VITAMIN DAILY) Oral Tab Take by mouth.      Probiotic Product (PROBIOTIC-10 OR) Take by mouth.      fluticasone propionate 50 MCG/ACT Nasal Suspension 2 sprays by Each Nare route daily. 1 each 1      Past Medical History:    Abdominal pain    Constipation    Flatulence/gas pain/belching    Food intolerance    Headache disorder    Heartburn    Indigestion    Irregular bowel habits    Sleep disturbance      Social History:  Social History      Socioeconomic History    Marital status:    Tobacco Use    Smoking status: Former     Current packs/day: 0.00     Average packs/day: 1 pack/day for 8.0 years (8.0 ttl pk-yrs)     Types: Cigarettes     Start date: 5/1/2006     Quit date: 5/1/2014     Years since quitting: 10.6    Smokeless tobacco: Never   Substance and Sexual Activity    Alcohol use: Yes     Alcohol/week: 2.0 standard drinks of alcohol     Types: 2 Cans of beer per week    Drug use: Never      No past surgical history on file.      REVIEW OF SYSTEMS:   GENERAL HEALTH: feels well otherwise  GENERAL : denies fever, chills, sweats, weight loss, weight gain  SKIN: denies any unusual skin lesions or rashes  RESPIRATORY: denies shortness of breath with exertion  NEURO: denies headaches    EXAM:   There were no vitals taken for this visit.    System Findings Details   Constitutional  Overall appearance - Normal.   Head/Face  Facial features -- Normal. Skull - Normal.   Eyes  Sclera white, pupils equal and round, EOMI   Ears  External ears normal in appearance, EACs patent, TMs intact bilaterally, no evidence of middle ear effusion   Nose  External nose with C-shaped deviation, nares patent, septum with C shaped deviation, anteriorly to the right, posteriorly to the left, left anterior septum with scabbing, no prominent vasculature noted, no epistaxis   Throat  Posterior pharynx clear, uvula midline, tonsils 1+   Oral cavity  Lips normal in appearance, mucous membranes clear, no masses, FOM soft, tongue without lesions, crossbite with visual differential mandibular height   Neck  Trachea midline   Neurological  Memory - Normal. Cranial nerves - Cranial nerves II through XII grossly intact.     Nasal endoscopy  Indication: Epistaxis  Verbal consent was obtained. Lidocaine and afrin nasal spray was administered. Rigid endoscopes were used to perform the examination.   Right nare: Mucosa healthy appearing, Middle meatus clear. No evidence of masses  or epistaxis.  Difficulty passing to nasopharynx secondary to septal deviation.     Left nare: Anterior septum with scab, no active epistaxis, mucosa otherwise healthy appearing, Middle meatus clear. No evidence of masses or epistaxis. Patent to nasopharynx. SER clear.       ASSESSMENT AND PLAN:   39 year old male presenting for evaluation of multiple complaints.    -Reviewed use of nasal saline spray (provided Ayr sample), humidifier, vaseline to nares, avoiding nose picking  -Educated on use of afrin and firm pressure to anterior nares for 15 minutes in case of repeat bleeding, seek emergency evaluation if bleeding persists  -Patient with deviated nasal septum but is asymptomatic, no surgical intervention indicated  -Facial pressure and headaches without coinciding nasal symptoms is nonspecific, consider neurology evaluation for headaches    The patient indicates understanding of these issues and agrees to the plan.    Itzel Jimenez MD  12/17/2024  3:00 PM

## 2024-12-17 NOTE — PATIENT INSTRUCTIONS
Nosebleed (Adult)    Bleeding from the nose most commonly occurs because of injury or drying and cracking of the inner lining of the nose. Most nosebleeds are because of dry air or nose-picking. They can occur during a common cold or an allergy attack. They can also occur on a very hot day, or from dry air in the winter. Most nosebleeds are not serious and are located towards the front of the nose. Nosebleeds that are located toward the back of the nose, closer to the throat, are less common, but may be harder to control and become more serious.  In cases where the bleeding is not able to be controlled, the site of the bleeding is found, and may be cauterized. This means it's treated to cause a blood clot to form. This may be done with a chemical, heat, or electricity. If the bleeding continues after the site is cauterized, or if the site can't be found, packing may be put in your nose. This is to apply pressure and stop the bleeding. The packing may be made of gauze or sponge. A small balloon catheter is sometimes used. These must be removed by your healthcare provider. Some types of packing dissolve on their own. In rare cases, surgery or embolization (sealing of the nose blood vessels using a catheter through an artery) is needed to stop a nosebleed. If you are taking blood thinning (anticoagulant) medicine, you may have a blood test and be advised to stop taking certain medicines for a while.  Home care  If packing was put in your nose, unless told otherwise, don't pull on it or try to remove it yourself. You will be given an appointment to have it removed. You may also have been given antibiotics to prevent a sinus infection. If so, finish all of the medicine.  Don't blow your nose for 12 hours after the bleeding stops. This will allow a strong blood clot to form. After that, if you have to blow your nose, do it very gently. Don't pick your nose. This may restart bleeding.  Don't drink alcohol or hot liquids  for the next 2 days. Alcohol or hot liquids in your mouth can dilate blood vessels in your nose. This can cause bleeding to start again.  Don't take ibuprofen, naproxen, or medicines that contain aspirin. These thin the blood and may cause your nose to bleed. You may take acetaminophen for pain, unless another pain medicine was prescribed. Talk with your healthcare provider before using acetaminophen if you have chronic liver disease.  If the bleeding starts again, sit up and lean forward to prevent swallowing blood. Pinch your nose tightly on both sides, as shown above, for 10 to 15 minutes. Time yourself. Don’t release the pressure on your nose until at least 10 minutes is up. If bleeding doesn't stop, continue to pinch your nose and, if the bleeding is a small amount, call your healthcare provider. If bleeding is heavy, call 911 or return to this facility.  If you have a cold, allergies, or dry nasal membranes, lubricate the nasal passages. Gently apply a small amount of petroleum jelly inside the nose with a cotton swab twice a day (morning and night).  Don't overheat your home. This can dry the air and make your condition worse.  Put a humidifier in the room where you sleep. This will add moisture to the air. Clean the humidifier as advised by the .  Use a saline nasal spray to keep nasal passages moist.  Don't pick your nose. Keep fingernails trimmed to decrease risk of bleeds.  Don't smoke.    Follow-up care  -Ocean nasal spray at least twice daily (ideally 3-6x)  -Continue use of humidifier  -Vaseline to nasal rim daily using a q-tip  -Avoid nose picking  -In case of repeat bleed, apply afrin nasal spray and hold pressure to front of nose for 15 minutes. If bleeding is still not controlled, seek medical attention.   When to get medical advice  Call your healthcare provider right away if any of these occur.  You have intermittent, recurrent, small amounts of bleeding that you can control for a  while  Fever of 100.4ºF (38ºC) or higher, or as directed by your healthcare provider  Headache  Sinus or facial pain  Call 911  Call 911 or get medical care right away if any of the following occur:  Dizziness, weakness, or fainting  Shortness of breath or trouble breathing  You have another nosebleed that you can't control, or with heavy bleeding  You become abnormally tired or confused  Mehrdad last reviewed this educational content on 7/1/2022 © 2000-2023 [employer]. All rights reserved. This information is not intended as a substitute for professional medical care. Always follow your healthcare professional's instructions.

## 2025-01-06 ENCOUNTER — HOSPITAL ENCOUNTER (OUTPATIENT)
Dept: MRI IMAGING | Age: 40
Discharge: HOME | End: 2025-01-06
Attending: FAMILY MEDICINE
Payer: COMMERCIAL

## 2025-01-06 DIAGNOSIS — M54.12 CERVICAL RADICULOPATHY: ICD-10-CM

## 2025-01-06 PROCEDURE — 72141 MRI NECK SPINE W/O DYE: CPT | Performed by: FAMILY MEDICINE

## 2025-01-23 ENCOUNTER — OFFICE VISIT (OUTPATIENT)
Dept: FAMILY MEDICINE CLINIC | Facility: CLINIC | Age: 40
End: 2025-01-23
Payer: COMMERCIAL

## 2025-01-23 VITALS
HEIGHT: 70 IN | HEART RATE: 91 BPM | SYSTOLIC BLOOD PRESSURE: 104 MMHG | DIASTOLIC BLOOD PRESSURE: 70 MMHG | RESPIRATION RATE: 20 BRPM | OXYGEN SATURATION: 98 % | BODY MASS INDEX: 22 KG/M2

## 2025-01-23 DIAGNOSIS — E78.00 PURE HYPERCHOLESTEROLEMIA: ICD-10-CM

## 2025-01-23 DIAGNOSIS — M54.2 CHRONIC NECK PAIN: ICD-10-CM

## 2025-01-23 DIAGNOSIS — E55.9 VITAMIN D DEFICIENCY: ICD-10-CM

## 2025-01-23 DIAGNOSIS — G89.29 CHRONIC NECK PAIN: ICD-10-CM

## 2025-01-23 DIAGNOSIS — R22.31 MASS OF RIGHT UPPER EXTREMITY: Primary | ICD-10-CM

## 2025-01-23 DIAGNOSIS — R20.0 HAND NUMBNESS: ICD-10-CM

## 2025-01-23 PROCEDURE — 99214 OFFICE O/P EST MOD 30 MIN: CPT | Performed by: FAMILY MEDICINE

## 2025-01-23 PROCEDURE — 3074F SYST BP LT 130 MM HG: CPT | Performed by: FAMILY MEDICINE

## 2025-01-23 PROCEDURE — 3078F DIAST BP <80 MM HG: CPT | Performed by: FAMILY MEDICINE

## 2025-01-23 RX ORDER — PREDNISONE 20 MG/1
TABLET ORAL
Qty: 13 TABLET | Refills: 0 | Status: SHIPPED | OUTPATIENT
Start: 2025-01-23

## 2025-01-23 NOTE — PROGRESS NOTES
Subjective:   Patient ID: Miguel Maldonado is a 39 year old male.    After last visit. Noted bump under skin right arm.  Not movable.  Not tender.  Not changing.    Had b/l lower leg pains.  Was taking the vit D 61805 units daily x 4 days.  Then realized and stopped.  And now restarted it yesterday with once a week.  Pain is gone now.    Chronic neck pain and numbness and 3rd and 4th digit.  Just had MRI done.        History/Other:   Review of Systems   All other systems reviewed and are negative.    Current Outpatient Medications   Medication Sig Dispense Refill    predniSONE 20 MG Oral Tab 2.5 tab day 1-3, 2 tab day 4, 1.5 tab day 5, 1 tab day 6, 0.5 tab day 7 13 tablet 0    oxymetazoline 0.05 % Nasal Solution 2 sprays by Nasal route 2 (two) times daily. each nostril morning and evening 30 mL 0    ergocalciferol 1.25 MG (39647 UT) Oral Cap Take 1 capsule (50,000 Units total) by mouth once a week. Once weekly x 20 weeks.  Take with food 20 capsule 0    Multiple Vitamin (MULTI-VITAMIN DAILY) Oral Tab Take by mouth.      Probiotic Product (PROBIOTIC-10 OR) Take by mouth.      fluticasone propionate 50 MCG/ACT Nasal Suspension 2 sprays by Each Nare route daily. 1 each 1     Allergies:Allergies[1]    Objective:   Physical Exam  Vitals reviewed.   Constitutional:       General: He is not in acute distress.     Appearance: He is well-developed. He is not diaphoretic.   Eyes:      General:         Right eye: No discharge.         Left eye: No discharge.      Conjunctiva/sclera: Conjunctivae normal.   Cardiovascular:      Rate and Rhythm: Normal rate and regular rhythm.      Heart sounds: Normal heart sounds. No murmur heard.  Pulmonary:      Effort: Pulmonary effort is normal. No respiratory distress.      Breath sounds: Normal breath sounds. No wheezing or rales.   Skin:     Comments: Can see dorsal forarm mass he is talking about but its not palpable.  Not tender.   Neurological:      Comments: Difficult for pt to tell but  appears negative spurlings test.  + cubital tunnel syndrome.         Assessment & Plan:   1. Mass of right upper extremity    2. Chronic neck pain    3. Hand numbness    4. Pure hypercholesterolemia    5. Vitamin D deficiency      B12 and Bcomplex over the counter daily x 2 months.  Wear wrist splint daily x 1 month.  Dont look down.  Dont use phone too much.  Dont bend elbow too much.  Follow up neuro  Discussed MRI results which are not that significant.    1. Mass of right upper extremity  - US ARM RIGHT LIMITED (CPT=76882); Future    2. Chronic neck pain  - predniSONE 20 MG Oral Tab; 2.5 tab day 1-3, 2 tab day 4, 1.5 tab day 5, 1 tab day 6, 0.5 tab day 7  Dispense: 13 tablet; Refill: 0  - Neuro Referral - In Network    3. Hand numbness  - predniSONE 20 MG Oral Tab; 2.5 tab day 1-3, 2 tab day 4, 1.5 tab day 5, 1 tab day 6, 0.5 tab day 7  Dispense: 13 tablet; Refill: 0  - Neuro Referral - In Network    4. Pure hypercholesterolemia  - Lipid Panel [E]; Future    5. Vitamin D deficiency  - Vitamin D [E]; Future    Meds This Visit:  Requested Prescriptions     Signed Prescriptions Disp Refills    predniSONE 20 MG Oral Tab 13 tablet 0     Si.5 tab day 1-3, 2 tab day 4, 1.5 tab day 5, 1 tab day 6, 0.5 tab day 7       Imaging & Referrals:  NEURO - INTERNAL  US ARM RIGHT LIMITED (CPT=76882)         [1]   Allergies  Allergen Reactions    Dairy Digestive OTHER (SEE COMMENTS)     Gas bloating